# Patient Record
Sex: FEMALE | Race: WHITE | NOT HISPANIC OR LATINO | Employment: FULL TIME | URBAN - METROPOLITAN AREA
[De-identification: names, ages, dates, MRNs, and addresses within clinical notes are randomized per-mention and may not be internally consistent; named-entity substitution may affect disease eponyms.]

---

## 2024-08-06 ENCOUNTER — APPOINTMENT (OUTPATIENT)
Dept: RADIOLOGY | Facility: HOSPITAL | Age: 49
End: 2024-08-06
Payer: COMMERCIAL

## 2024-08-06 ENCOUNTER — HOSPITAL ENCOUNTER (EMERGENCY)
Facility: HOSPITAL | Age: 49
Discharge: HOME | End: 2024-08-06
Payer: COMMERCIAL

## 2024-08-06 VITALS
TEMPERATURE: 96.8 F | BODY MASS INDEX: 19.25 KG/M2 | WEIGHT: 127 LBS | OXYGEN SATURATION: 98 % | SYSTOLIC BLOOD PRESSURE: 134 MMHG | RESPIRATION RATE: 18 BRPM | DIASTOLIC BLOOD PRESSURE: 92 MMHG | HEIGHT: 68 IN | HEART RATE: 76 BPM

## 2024-08-06 DIAGNOSIS — I10 PRIMARY HYPERTENSION: ICD-10-CM

## 2024-08-06 DIAGNOSIS — R51.9 NONINTRACTABLE HEADACHE, UNSPECIFIED CHRONICITY PATTERN, UNSPECIFIED HEADACHE TYPE: Primary | ICD-10-CM

## 2024-08-06 PROBLEM — N97.9 FEMALE INFERTILITY: Status: ACTIVE | Noted: 2024-08-06

## 2024-08-06 PROBLEM — N92.1 MENORRHAGIA WITH IRREGULAR CYCLE: Status: ACTIVE | Noted: 2018-11-14

## 2024-08-06 PROBLEM — Z98.890 S/P ENDOMETRIAL ABLATION: Status: ACTIVE | Noted: 2019-01-24

## 2024-08-06 PROCEDURE — 2500000004 HC RX 250 GENERAL PHARMACY W/ HCPCS (ALT 636 FOR OP/ED): Performed by: PHYSICIAN ASSISTANT

## 2024-08-06 PROCEDURE — 70450 CT HEAD/BRAIN W/O DYE: CPT | Performed by: RADIOLOGY

## 2024-08-06 PROCEDURE — 70450 CT HEAD/BRAIN W/O DYE: CPT

## 2024-08-06 PROCEDURE — 96372 THER/PROPH/DIAG INJ SC/IM: CPT | Performed by: PHYSICIAN ASSISTANT

## 2024-08-06 PROCEDURE — 99284 EMERGENCY DEPT VISIT MOD MDM: CPT

## 2024-08-06 RX ORDER — DIAZEPAM 5 MG/1
5 TABLET ORAL EVERY 12 HOURS PRN
Qty: 6 TABLET | Refills: 0 | Status: SHIPPED | OUTPATIENT
Start: 2024-08-06

## 2024-08-06 RX ORDER — METOCLOPRAMIDE HYDROCHLORIDE 5 MG/ML
10 INJECTION INTRAMUSCULAR; INTRAVENOUS ONCE
Status: COMPLETED | OUTPATIENT
Start: 2024-08-06 | End: 2024-08-06

## 2024-08-06 ASSESSMENT — COLUMBIA-SUICIDE SEVERITY RATING SCALE - C-SSRS
1. IN THE PAST MONTH, HAVE YOU WISHED YOU WERE DEAD OR WISHED YOU COULD GO TO SLEEP AND NOT WAKE UP?: NO
2. HAVE YOU ACTUALLY HAD ANY THOUGHTS OF KILLING YOURSELF?: NO
6. HAVE YOU EVER DONE ANYTHING, STARTED TO DO ANYTHING, OR PREPARED TO DO ANYTHING TO END YOUR LIFE?: NO

## 2024-08-06 ASSESSMENT — PAIN SCALES - GENERAL
PAINLEVEL_OUTOF10: 7
PAINLEVEL_OUTOF10: 0 - NO PAIN

## 2024-08-06 ASSESSMENT — PAIN DESCRIPTION - LOCATION: LOCATION: HEAD

## 2024-08-06 ASSESSMENT — PAIN - FUNCTIONAL ASSESSMENT: PAIN_FUNCTIONAL_ASSESSMENT: 0-10

## 2024-08-06 NOTE — ED NOTES
Community Resource Name: List of  primary care physicians  Phone Number:   Staff Member:  Orquidea Beatty    Discussed the following topics on behalf of the patient:  []  Behavioral Health Assistance     []  Case Management  []   Assistance  []  Digital Equity Assistance  []  Dental Health Assistance  []  Education Assistance  []  Employment Assistance  []  Financial Strain Relief Assistance  []  Food Insecurity Assistance  [x]  Healthcare Coverage Assistance  []  Housing Stability Assistance  []  IP Violence Relief Assistance  []  Legal Assistance  []  Physical Activity Assistance  []  Social Connection Assistance  []  Stress Relief Assistance   []  Substance Abuse Assistance  []  Transportation Assistance  []  Utility Assistance  [x]  Other: [insert comment here]  Patient does not have a PCP.  Next Steps:         PALMIRA Welch   CHW talked to the patient regarding not having a primary care physician. CHW asked the patient if she would be interested in looking at a list of  physicians to choose from for future services. Patient agreed and was given a list of  physicians, they are accepting new patients, they are taking her insurance ([employer]), and they are in the area where she lives. Patient expressed appreciation.      PALMIRA Welch  08/06/24 1715

## 2024-08-06 NOTE — ED TRIAGE NOTES
Pt presents to the ED from home with c/o headache lasting 3 weeks. Pt states that she has gone to urgent care and her blood pressure ran high there and they prescribed her propanolol. Pt states she has tried to take that and ibuprofen with no relief. Pt states she gets to the point where it wakes her up at night as well. Pt leaves for a trip on Friday and is concerned the stress is also making it worse. Pt denies any cardiac hx.

## 2024-08-06 NOTE — ED PROVIDER NOTES
HPI   Chief Complaint   Patient presents with    Headache       History of present illness: 49-year-old female with history of anxiety complains of a headache for the last 3 weeks.  Headaches has been on and off.  This weekend it got a little worse.  She says it is pressure-like and changes locations a little more occipital when it occurs.  Patient states previously she would get headaches once a week or month but they feel different.  She had an MRI decades ago and was told that she may have a Chiari malformation.  Patient states she sometimes has some photosensitivity.  Denies nausea vomiting lightheadedness dizziness diplopia blurred vision paresthesias incontinence difficulty ambulating.  She has been taking ibuprofen with partial pain relief.  Currently the pain is 7 out of 10.  Patient reports that she has anxiety and is increasingly worried that something is wrong and if this will affect her upcoming trip to [place].    Review of systems: Constitutional, eye, ENT, cardiovascular, respiratory, gastrointestinal, genitourinary, neurologic, musculoskeletal, dermatologic, hematologic, endocrine systems were evaluated and were negative unless otherwise specified in history of present illness.    Medications: Reviewed and per nursing note.    Family history: Denies relevant medical conditions.    Social history: Denies tobacco, alcohol, drug use.      Physical exam:    Appearance: Well-developed, well-nourished, nontoxic-appearing, alert and oriented x3. Talking in complete sentences.    HEENT:  Head normocephalic atraumatic, extraocular movements intact, pupils equal round reactive to light, mucous membranes are moist and pink.    NECK:  Nml Inspection, no meningismus, no thyromegaly, no lymphadenopathy, no JVD, trachea is midline.    Respiratory: Clear to auscultation bilaterally with normal bilateral excursion. No wheezes, rhonchi, crackles.    Cardiovascular: Regular rate and rhythm, no murmurs, rubs or gallops.  Pulses 2+ symmetrically in the dorsalis pedis and radial pulses.    Abdomen/GI:  Soft, nontender, nondistended, normal bowel sounds x4. No masses or organomegaly.    :  No CVA tenderness    Neuro:  Oriented x 3, Speech Clear, cranial nerves grossly intact. Normal sensation to light touch in all 4 extremities.    Musculoskeletal: Patient spontaneously moves all 4 extremities.    Skin:  No cyanosis, clubbing, edema, open wounds, or rashes.              Patient History   Past Medical History:   Diagnosis Date    Personal history of pre-term labor     History of pre-term labor    Recurrent pregnancy loss     History of recurrent miscarriages, not currently pregnant     Past Surgical History:   Procedure Laterality Date    DILATION AND CURETTAGE OF UTERUS  06/04/2015    Dilation And Curettage    OTHER SURGICAL HISTORY  06/04/2015    [facility] In Vitro Fertilization     No family history on file.  Social History     Tobacco Use    Smoking status: Not on file    Smokeless tobacco: Not on file   Substance Use Topics    Alcohol use: Not on file    Drug use: Not on file       Physical Exam   ED Triage Vitals   Temperature Heart Rate Respirations BP   08/06/24 0805 08/06/24 0805 08/06/24 0805 08/06/24 0805   36 °C (96.8 °F) 84 16 (!) 175/116      Pulse Ox Temp Source Heart Rate Source Patient Position   08/06/24 0805 08/06/24 0805 08/06/24 0826 08/06/24 0826   99 % Temporal Monitor Lying      BP Location FiO2 (%)     08/06/24 0826 --     Right arm        Physical Exam      ED Course & MDM   Diagnoses as of 08/06/24 1248   Nonintractable headache, unspecified chronicity pattern, unspecified headache type   Primary hypertension                       Gavin Coma Scale Score: 15                        Medical Decision Making  EKG: Normal sinus rhythm with no ST segment elevation or ectopy.  ID interval 134 with a QTc 426.  There is no clear acute ischemia.  This interpreted by myself.    CT head wo IV contrast    Final Result    No acute intracranial process.                      MACRO:    None.          Signed by: Simon Chaim 8/6/2024 12:26 PM    Dictation workstation:   [id number]         Patient presents with headache for 3 weeks.  Differential diagnosis of primary headache including tension cluster migraine headache and secondary headache such as intracranial hemorrhage mass meningitis.  Examination has normal cranial nerve peripheral nerve exam.  Patient's blood pressure is elevated at 154/92.  She has been recently initiated on propranolol for blood pressure.  Patient reports that she has increasing stress and is worried this will affect her upcoming trip to [place].    CT head and Reglan IM ordered.  Patient's headache improved and then came back.  CT head showed no acute findings.  Presentation more likely a primary headache which could be a tension or migraine variant.  Has some underlying anxiety.  Patient will be given prescription for Valium for headache.  Recommend to continue propranolol.  Recommend follow-up with primary care for blood pressure reevaluation and neurology if headache is persistent.    Patient will be discharged to home with prescription.  Patient is educated in signs and symptoms of worsening symptoms and reasons to come back to the emergency department.  Will need to follow up with primary care provider and Neurology.  Patient does not report social determinants of health impacting ability to obtain care that is needed.  Patient agrees with plan.    This is a transcription.  Text was reviewed for errors, but some transcription errors may remain.  Please call for any questions.          Procedure  Procedures     Jorge Goldstein PA-C  08/06/24 1245       Jorge Goldstein PA-C  08/06/24 1248

## 2024-08-07 ENCOUNTER — OFFICE VISIT (OUTPATIENT)
Dept: PRIMARY CARE | Facility: CLINIC | Age: 49
End: 2024-08-07
Payer: COMMERCIAL

## 2024-08-07 ENCOUNTER — HOSPITAL ENCOUNTER (OUTPATIENT)
Dept: RADIOLOGY | Facility: CLINIC | Age: 49
Discharge: HOME | End: 2024-08-07
Payer: COMMERCIAL

## 2024-08-07 ENCOUNTER — LAB (OUTPATIENT)
Dept: LAB | Facility: LAB | Age: 49
End: 2024-08-07
Payer: COMMERCIAL

## 2024-08-07 VITALS
SYSTOLIC BLOOD PRESSURE: 147 MMHG | HEIGHT: 68 IN | OXYGEN SATURATION: 98 % | HEART RATE: 83 BPM | DIASTOLIC BLOOD PRESSURE: 98 MMHG | BODY MASS INDEX: 19.1 KG/M2 | WEIGHT: 126 LBS

## 2024-08-07 DIAGNOSIS — I10 PRIMARY HYPERTENSION: ICD-10-CM

## 2024-08-07 DIAGNOSIS — Z01.30 BLOOD PRESSURE CHECK: ICD-10-CM

## 2024-08-07 DIAGNOSIS — Z13.1 SCREENING FOR DIABETES MELLITUS: ICD-10-CM

## 2024-08-07 DIAGNOSIS — M54.2 NECK PAIN: Primary | Chronic | ICD-10-CM

## 2024-08-07 DIAGNOSIS — M54.2 NECK PAIN: ICD-10-CM

## 2024-08-07 DIAGNOSIS — Z13.220 SCREENING FOR HYPERLIPIDEMIA: ICD-10-CM

## 2024-08-07 DIAGNOSIS — F41.1 GENERALIZED ANXIETY DISORDER: ICD-10-CM

## 2024-08-07 DIAGNOSIS — M62.838 NECK MUSCLE SPASM: ICD-10-CM

## 2024-08-07 PROCEDURE — 3008F BODY MASS INDEX DOCD: CPT | Performed by: INTERNAL MEDICINE

## 2024-08-07 PROCEDURE — 72050 X-RAY EXAM NECK SPINE 4/5VWS: CPT

## 2024-08-07 PROCEDURE — 1036F TOBACCO NON-USER: CPT | Performed by: INTERNAL MEDICINE

## 2024-08-07 PROCEDURE — 81001 URINALYSIS AUTO W/SCOPE: CPT

## 2024-08-07 PROCEDURE — 82570 ASSAY OF URINE CREATININE: CPT

## 2024-08-07 PROCEDURE — 3077F SYST BP >= 140 MM HG: CPT | Performed by: INTERNAL MEDICINE

## 2024-08-07 PROCEDURE — 82043 UR ALBUMIN QUANTITATIVE: CPT

## 2024-08-07 PROCEDURE — 36415 COLL VENOUS BLD VENIPUNCTURE: CPT

## 2024-08-07 PROCEDURE — 99205 OFFICE O/P NEW HI 60 MIN: CPT | Performed by: INTERNAL MEDICINE

## 2024-08-07 PROCEDURE — 3080F DIAST BP >= 90 MM HG: CPT | Performed by: INTERNAL MEDICINE

## 2024-08-07 PROCEDURE — 80061 LIPID PANEL: CPT

## 2024-08-07 PROCEDURE — 80053 COMPREHEN METABOLIC PANEL: CPT

## 2024-08-07 RX ORDER — BUSPIRONE HYDROCHLORIDE 5 MG/1
5 TABLET ORAL 3 TIMES DAILY
Qty: 45 TABLET | Refills: 0 | Status: SHIPPED | OUTPATIENT
Start: 2024-08-07 | End: 2024-08-22

## 2024-08-07 RX ORDER — METHOCARBAMOL 500 MG/1
500 TABLET, FILM COATED ORAL 3 TIMES DAILY
Qty: 45 TABLET | Refills: 0 | Status: SHIPPED | OUTPATIENT
Start: 2024-08-07 | End: 2024-08-22

## 2024-08-07 NOTE — PROGRESS NOTES
"Patient ID: Danette Couch is a 49 y.o. female who presents for Establish Care (New patient here to [facility] care).    BP (!) 147/98   Pulse 83   Ht 1.727 m (5' 8\")   Wt 57.2 kg (126 lb)   LMP 07/23/2024   SpO2 98%   BMI 19.16 kg/m²     HPI      I HAVE BEEN EXPERIENCING EXCRUCIATING HEADACHE   FOR THE LAST FEW WKS , IT STARTS AT THE BACK OF THE NECK   RADIATES TO THE BACK , IT FEELS PRESSURE , AND TIGHTNESS    ONSET 3WKS BEFORE ,  NO RELATIONSHIP TO POSITION OR THE POSTURE , SLIGHT NAUSEA , NO BLURRY , NO DOIUBLE VISION ,  NOT AGGRAVATED BY CHANGE IN POSITION ,  NEVER HAD MIGRAINE HEADACHE BEFORE , NEVER HAD SIMILAR HEADACHE BEFORE BEFORE , MY SISTER AND BROTHER HAVE HTN AND SISTER IS ON MEDICINE , I AM NOT SENSITIVE TO SALT , I HAVE BEEN TAKING OTC IBUPROFEN 6 PILLS AND NOT EVEN RELIEVING THE HEADACHE , I WAKE UP WITH THE HEADACHE , SHE DOES NOT HAVE UNSTEADY GAIT , NO SEIZURE   NO EXTREMITY WEAKNESS , NO TINGLING AND NUMBNESS IN EXTREMITIES     PT HAS HX OF MULTIPLE BOTOX INJECTION     I HAVE NECK ISSUES FOR THE LAST 2YRS ,. NO INJURY OR TRAUMA       RECENT CT HEAD W/O CONTRAST NORMAL     PT IS ANXIOUS , MORE WITH HER BLOOD PRESSURE             Subjective     Review of Systems   Constitutional: Negative.    All other systems reviewed and are negative.      Objective     Physical Exam  Vitals and nursing note reviewed.   Eyes:      Extraocular Movements: Extraocular movements intact.      Right eye: No nystagmus.      Comments: FUNDUS EXAM NORMAL   THOUGH UNDILATED EXAM    Neck:      Vascular: No carotid bruit.   Cardiovascular:      Rate and Rhythm: Normal rate and regular rhythm.      Pulses: Normal pulses.      Heart sounds: Normal heart sounds. No murmur heard.  Pulmonary:      Effort: Pulmonary effort is normal.      Breath sounds: Normal breath sounds.   Musculoskeletal:      Right lower leg: No edema.      Left lower leg: No edema.   Skin:     Capillary Refill: Capillary refill takes more than 3 " "seconds.   Neurological:      General: No focal deficit present.      Mental Status: She is oriented to person, place, and time. Mental status is at baseline.      Cranial Nerves: No cranial nerve deficit.      Motor: No weakness.      Coordination: Coordination normal.      Gait: Gait normal.      Deep Tendon Reflexes: Reflexes normal.   Psychiatric:         Mood and Affect: Mood normal.         Behavior: Behavior normal.         Thought Content: Thought content normal.         Judgment: Judgment normal.         No results found for: \"WBC\", \"HGB\", \"HCT\", \"MCV\", \"PLT\"        Problem List Items Addressed This Visit       Blood pressure check     Other Visit Diagnoses       Neck pain  (Chronic)   -  Primary    Relevant Orders    XR cervical spine complete 4-5 views (Completed)    Primary hypertension        Relevant Orders    Metanephrines, Fractionated, Urine (24 hour or Random)    Urinalysis with Reflex Microscopic (Completed)    Albumin-Creatinine Ratio, Urine Random (Completed)    Generalized anxiety disorder        Relevant Medications    busPIRone (Buspar) 5 mg tablet    Screening for diabetes mellitus        Relevant Orders    Comprehensive metabolic panel (Completed)    Screening for hyperlipidemia        Relevant Orders    Lipid panel (Completed)    Neck muscle spasm        Relevant Medications    methocarbamol (Robaxin) 500 mg tablet              A/P       WILL GET URINE   URINE MICROALBUMIN   URINE METANEPHRINES 24 HRS   LIPID PANEL   CMP   BUSPAR 5MG 1 PILL THREE TIMES A DAY   METHOCARBAMOL 500MG 1 PILL THREE TIMES A DAY   XRAY CERVICAL SPINE 4-5 VIEWS   FOLLOW UP WITH ME ON 08/28/2024              "

## 2024-08-08 LAB
ALBUMIN SERPL BCP-MCNC: 4.7 G/DL (ref 3.4–5)
ALP SERPL-CCNC: 41 U/L (ref 33–110)
ALT SERPL W P-5'-P-CCNC: 12 U/L (ref 7–45)
ANION GAP SERPL CALC-SCNC: 16 MMOL/L (ref 10–20)
APPEARANCE UR: ABNORMAL
AST SERPL W P-5'-P-CCNC: 13 U/L (ref 9–39)
BILIRUB SERPL-MCNC: 0.9 MG/DL (ref 0–1.2)
BILIRUB UR STRIP.AUTO-MCNC: NEGATIVE MG/DL
BUN SERPL-MCNC: 14 MG/DL (ref 6–23)
CALCIUM SERPL-MCNC: 9.8 MG/DL (ref 8.6–10.6)
CHLORIDE SERPL-SCNC: 101 MMOL/L (ref 98–107)
CHOLEST SERPL-MCNC: 193 MG/DL (ref 0–199)
CHOLESTEROL/HDL RATIO: 3.1
CO2 SERPL-SCNC: 26 MMOL/L (ref 21–32)
COLOR UR: ABNORMAL
CREAT SERPL-MCNC: 0.63 MG/DL (ref 0.5–1.05)
CREAT UR-MCNC: 134.4 MG/DL (ref 20–320)
EGFRCR SERPLBLD CKD-EPI 2021: >90 ML/MIN/1.73M*2
GLUCOSE SERPL-MCNC: 80 MG/DL (ref 74–99)
GLUCOSE UR STRIP.AUTO-MCNC: NORMAL MG/DL
HDLC SERPL-MCNC: 63.2 MG/DL
KETONES UR STRIP.AUTO-MCNC: ABNORMAL MG/DL
LDLC SERPL CALC-MCNC: 96 MG/DL
LEUKOCYTE ESTERASE UR QL STRIP.AUTO: ABNORMAL
MICROALBUMIN UR-MCNC: 15.6 MG/L
MICROALBUMIN/CREAT UR: 11.6 UG/MG CREAT
MUCOUS THREADS #/AREA URNS AUTO: ABNORMAL /LPF
NITRITE UR QL STRIP.AUTO: NEGATIVE
NON HDL CHOLESTEROL: 130 MG/DL (ref 0–149)
PH UR STRIP.AUTO: 5.5 [PH]
POTASSIUM SERPL-SCNC: 4.5 MMOL/L (ref 3.5–5.3)
PROT SERPL-MCNC: 7.1 G/DL (ref 6.4–8.2)
PROT UR STRIP.AUTO-MCNC: ABNORMAL MG/DL
RBC # UR STRIP.AUTO: ABNORMAL /UL
RBC #/AREA URNS AUTO: ABNORMAL /HPF
SODIUM SERPL-SCNC: 138 MMOL/L (ref 136–145)
SP GR UR STRIP.AUTO: 1.02
SQUAMOUS #/AREA URNS AUTO: ABNORMAL /HPF
TRIGL SERPL-MCNC: 171 MG/DL (ref 0–149)
UROBILINOGEN UR STRIP.AUTO-MCNC: NORMAL MG/DL
VLDL: 34 MG/DL (ref 0–40)
WBC #/AREA URNS AUTO: ABNORMAL /HPF
YEAST BUDDING #/AREA UR COMP ASSIST: PRESENT /HPF

## 2024-08-11 PROBLEM — Z01.30 BLOOD PRESSURE CHECK: Status: ACTIVE | Noted: 2024-08-11

## 2024-08-11 PROBLEM — R03.0 ELEVATED BLOOD PRESSURE READING: Status: ACTIVE | Noted: 2024-08-11

## 2024-08-11 ASSESSMENT — ENCOUNTER SYMPTOMS: CONSTITUTIONAL NEGATIVE: 1

## 2024-08-15 DIAGNOSIS — F41.1 GENERALIZED ANXIETY DISORDER: ICD-10-CM

## 2024-08-16 RX ORDER — BUSPIRONE HYDROCHLORIDE 5 MG/1
5 TABLET ORAL 3 TIMES DAILY
Qty: 270 TABLET | Refills: 0 | Status: SHIPPED | OUTPATIENT
Start: 2024-08-16

## 2024-08-23 ENCOUNTER — LAB (OUTPATIENT)
Dept: LAB | Facility: LAB | Age: 49
End: 2024-08-23
Payer: COMMERCIAL

## 2024-08-23 DIAGNOSIS — I10 PRIMARY HYPERTENSION: ICD-10-CM

## 2024-08-23 PROCEDURE — 83835 ASSAY OF METANEPHRINES: CPT

## 2024-08-27 LAB
COLLECT DURATION TIME SPEC: 24 HR
CREAT 24H UR-MRATE: 945 MG/D (ref 700–1600)
CREAT UR-MCNC: 90 MG/DL
METANEPH 24H UR-MCNC: 47 UG/L
METANEPH 24H UR-MRATE: 49 UG/D (ref 36–229)
METANEPH+NORMETANEPH UR-IMP: NORMAL
METANEPH/CREAT 24H UR: 52 UG/G CRT (ref 0–300)
NORMETANEPHRINE 24H UR-MCNC: 147 UG/L
NORMETANEPHRINE 24H UR-MRATE: 154 UG/D (ref 95–650)
NORMETANEPHRINE/CREAT 24H UR: 163 UG/G CRT (ref 0–400)
SPECIMEN VOL ?TM UR: 1050 ML

## 2024-08-28 ENCOUNTER — APPOINTMENT (OUTPATIENT)
Dept: PRIMARY CARE | Facility: CLINIC | Age: 49
End: 2024-08-28
Payer: COMMERCIAL

## 2024-08-28 VITALS
BODY MASS INDEX: 19.1 KG/M2 | HEART RATE: 73 BPM | WEIGHT: 126 LBS | HEIGHT: 68 IN | DIASTOLIC BLOOD PRESSURE: 72 MMHG | SYSTOLIC BLOOD PRESSURE: 128 MMHG

## 2024-08-28 DIAGNOSIS — F32.A ANXIETY AND DEPRESSION: ICD-10-CM

## 2024-08-28 DIAGNOSIS — F41.9 ANXIETY AND DEPRESSION: ICD-10-CM

## 2024-08-28 DIAGNOSIS — Z01.30 BLOOD PRESSURE CHECK: ICD-10-CM

## 2024-08-28 DIAGNOSIS — M62.838 NECK MUSCLE SPASM: Primary | Chronic | ICD-10-CM

## 2024-08-28 PROCEDURE — 1036F TOBACCO NON-USER: CPT | Performed by: INTERNAL MEDICINE

## 2024-08-28 PROCEDURE — 3008F BODY MASS INDEX DOCD: CPT | Performed by: INTERNAL MEDICINE

## 2024-08-28 PROCEDURE — 99213 OFFICE O/P EST LOW 20 MIN: CPT | Performed by: INTERNAL MEDICINE

## 2024-08-28 RX ORDER — METHOCARBAMOL 500 MG/1
500 TABLET, FILM COATED ORAL 3 TIMES DAILY
Qty: 45 TABLET | Refills: 1 | Status: SHIPPED | OUTPATIENT
Start: 2024-08-28 | End: 2024-09-12

## 2024-08-28 ASSESSMENT — ENCOUNTER SYMPTOMS: CONSTITUTIONAL NEGATIVE: 1

## 2024-08-28 NOTE — PROGRESS NOTES
"Patient ID: Danette Couch is a 49 y.o. female who presents for Follow-up.    /72   Pulse 73   Ht 1.727 m (5' 8\")   Wt 57.2 kg (126 lb)   LMP 07/23/2024   BMI 19.16 kg/m²     HPI      PT HERE FOR FOLLOW UP   BLOOD PRESSURE CHECK     DOING BETTER     HEADACHE IMPROVED   SINCE TAKING ROBAXIN       ANXIETY SEEMS TO BE BETTER   ON BUSPIRON 5MG 1 PILL THREE TIMES A DAY     Subjective     Review of Systems   Constitutional: Negative.    All other systems reviewed and are negative.      Objective     Physical Exam  Vitals and nursing note reviewed.   Neck:      Vascular: No carotid bruit.   Cardiovascular:      Rate and Rhythm: Normal rate and regular rhythm.      Pulses: Normal pulses.      Heart sounds: Normal heart sounds. No murmur heard.  Musculoskeletal:      Right lower leg: No edema.      Left lower leg: No edema.      Comments: NECK FLEXION/EXTENSION NORMAL   SLIGHT TENDERNESS OVER THE TRAPEZIUS MUSCLE    Neurological:      General: No focal deficit present.      Mental Status: She is oriented to person, place, and time. Mental status is at baseline.   Psychiatric:         Mood and Affect: Mood normal.         Behavior: Behavior normal.         Thought Content: Thought content normal.         Judgment: Judgment normal.         No results found for: \"WBC\", \"HGB\", \"HCT\", \"MCV\", \"PLT\"        Problem List Items Addressed This Visit       Anxiety and depression    Blood pressure check    Neck muscle spasm - Primary    Relevant Medications    methocarbamol (Robaxin) 500 mg tablet           A/P         ROBAXIN 500MG 1 PILL BID FILLED   CONTINUE BUSPAR 5MG 1 PILL THREE TIMES A DAY FILLED   SCHEDULE PHYSICAL IN 6 MONTHS TIME      "

## 2024-11-22 DIAGNOSIS — F41.1 GENERALIZED ANXIETY DISORDER: ICD-10-CM

## 2024-11-22 RX ORDER — BUSPIRONE HYDROCHLORIDE 5 MG/1
5 TABLET ORAL 3 TIMES DAILY
Qty: 270 TABLET | Refills: 0 | Status: SHIPPED | OUTPATIENT
Start: 2024-11-22

## 2024-11-23 ENCOUNTER — OFFICE VISIT (OUTPATIENT)
Dept: URGENT CARE | Age: 49
End: 2024-11-23
Payer: COMMERCIAL

## 2024-11-23 VITALS
TEMPERATURE: 97.6 F | HEIGHT: 69 IN | SYSTOLIC BLOOD PRESSURE: 152 MMHG | BODY MASS INDEX: 18.66 KG/M2 | RESPIRATION RATE: 20 BRPM | OXYGEN SATURATION: 100 % | DIASTOLIC BLOOD PRESSURE: 101 MMHG | HEART RATE: 85 BPM | WEIGHT: 126 LBS

## 2024-11-23 DIAGNOSIS — S29.011A MUSCLE STRAIN OF CHEST WALL, INITIAL ENCOUNTER: ICD-10-CM

## 2024-11-23 DIAGNOSIS — R10.9 LEFT FLANK PAIN: Primary | ICD-10-CM

## 2024-11-23 ASSESSMENT — ENCOUNTER SYMPTOMS: ABDOMINAL PAIN: 1

## 2024-11-23 NOTE — PATIENT INSTRUCTIONS
Please follow up with your primary provider within one week if symptoms do not improve.  You may schedule an appointment online at [email or web address] or call [phone]. Go to the Emergency Department if symptoms significantly worsen    I recommend ibuprofen, Tylenol, lidocaine patch for pain.

## 2024-11-23 NOTE — PROGRESS NOTES
"Subjective   Patient ID: Danette Couch is a 49 y.o. female. They present today with a chief complaint of Abdominal Pain (Sx started about a week ago.  Lower left anterior pain, tenderness when area is pushed on.).    History of Present Illness  Reports atraumatic LUQ pain, left flank pain, left mid back pain that she woke up with x1 week. States pain is present only with twisting, pressing on the area, or certain movements - never at rest. Denies associated SOB, abdominal pain, N/V, fever, constipation, diarrhea, urinary symptoms. LMP 3-4 weeks ago, has been regular. States she had been coughing for several weeks prior to onset, completed a round of antibiotics.      Abdominal Pain        Past Medical History  Allergies as of 11/23/2024 - Reviewed 11/23/2024   Allergen Reaction Noted    Bee venom protein (honey bee) Swelling 11/14/2010    Latex Hives and Itching 08/06/2024       (Not in a hospital admission)       Past Medical History:   Diagnosis Date    Personal history of pre-term labor     History of pre-term labor    Recurrent pregnancy loss     History of recurrent miscarriages, not currently pregnant       Past Surgical History:   Procedure Laterality Date    DILATION AND CURETTAGE OF UTERUS  06/04/2015    Dilation And Curettage    OTHER SURGICAL HISTORY  06/04/2015    [facility] Services In Vitro Fertilization        reports that she has never smoked. She has never used smokeless tobacco. She reports current alcohol use. She reports that she does not use drugs.    Review of Systems  Pertinent systems reviewed and were negative unless otherwise stated in HPI.    Objective    Vitals:    11/23/24 1306   BP: (!) 152/101   Pulse: 85   Resp: 20   Temp: 36.4 °C (97.6 °F)   SpO2: 100%   Weight: 57.2 kg (126 lb)   Height: 1.753 m (5' 9\")     No LMP recorded.    Physical Exam  Constitutional:       General: She is not in acute distress.  Cardiovascular:      Rate and Rhythm: Normal rate and regular rhythm.      " Heart sounds: Normal heart sounds.   Pulmonary:      Effort: Pulmonary effort is normal.   Abdominal:      Palpations: Abdomen is soft.      Tenderness: There is abdominal tenderness (LUQ, left flank). There is no right CVA tenderness, left CVA tenderness or guarding.   Skin:     Findings: No rash.   Psychiatric:         Mood and Affect: Mood normal.         Behavior: Behavior normal.         Diagnostic study results (if any) were reviewed by Gil Walter PA-C.    Assessment/Plan   Allergies, medications, history, and pertinent labs/EKGs/imaging reviewed by Gil Walter PA-C.     Medical Decision Making  Low concern for pneumonia, PE, PTX, MI, splenic etiology, constipation or SBO, pyelo, nephrolithiasis. Pain is reproducible along the left flank. No pain at rest. Lungs clear in no respiratory distress. No rash to suggest shingles. MSK etiology most likely.     Orders and Diagnoses  Diagnoses and all orders for this visit:  Left flank pain  Muscle strain of chest wall, initial encounter      Medical Admin Record      Disposition: Home    Electronically signed by Gil Walter PA-C

## 2024-12-13 ENCOUNTER — APPOINTMENT (OUTPATIENT)
Dept: PRIMARY CARE | Facility: CLINIC | Age: 49
End: 2024-12-13
Payer: COMMERCIAL

## 2024-12-17 ENCOUNTER — APPOINTMENT (OUTPATIENT)
Dept: PRIMARY CARE | Facility: CLINIC | Age: 49
End: 2024-12-17
Payer: COMMERCIAL

## 2025-01-15 ENCOUNTER — APPOINTMENT (OUTPATIENT)
Dept: PRIMARY CARE | Facility: CLINIC | Age: 50
End: 2025-01-15
Payer: COMMERCIAL

## 2025-01-16 ENCOUNTER — APPOINTMENT (OUTPATIENT)
Dept: NEUROLOGY | Facility: CLINIC | Age: 50
End: 2025-01-16
Payer: COMMERCIAL

## 2025-01-29 ENCOUNTER — APPOINTMENT (OUTPATIENT)
Dept: PRIMARY CARE | Facility: CLINIC | Age: 50
End: 2025-01-29
Payer: COMMERCIAL

## 2025-02-03 ENCOUNTER — APPOINTMENT (OUTPATIENT)
Dept: PRIMARY CARE | Facility: CLINIC | Age: 50
End: 2025-02-03
Payer: COMMERCIAL

## 2025-03-07 ENCOUNTER — APPOINTMENT (OUTPATIENT)
Dept: PRIMARY CARE | Facility: CLINIC | Age: 50
End: 2025-03-07
Payer: COMMERCIAL

## 2025-04-17 ENCOUNTER — APPOINTMENT (OUTPATIENT)
Dept: PRIMARY CARE | Facility: CLINIC | Age: 50
End: 2025-04-17
Payer: COMMERCIAL